# Patient Record
Sex: MALE | Race: OTHER | NOT HISPANIC OR LATINO | Employment: OTHER | URBAN - METROPOLITAN AREA
[De-identification: names, ages, dates, MRNs, and addresses within clinical notes are randomized per-mention and may not be internally consistent; named-entity substitution may affect disease eponyms.]

---

## 2022-05-23 ENCOUNTER — TELEPHONE (OUTPATIENT)
Dept: UROLOGY | Facility: CLINIC | Age: 82
End: 2022-05-23
Payer: MEDICARE

## 2022-05-24 DIAGNOSIS — R97.20 ELEVATED PSA: Primary | ICD-10-CM

## 2022-07-07 ENCOUNTER — OFFICE VISIT (OUTPATIENT)
Dept: UROLOGY | Facility: CLINIC | Age: 82
End: 2022-07-07
Payer: MEDICARE

## 2022-07-07 VITALS
BODY MASS INDEX: 20.72 KG/M2 | HEART RATE: 81 BPM | DIASTOLIC BLOOD PRESSURE: 75 MMHG | WEIGHT: 132 LBS | HEIGHT: 67 IN | SYSTOLIC BLOOD PRESSURE: 126 MMHG

## 2022-07-07 DIAGNOSIS — R97.20 ELEVATED PSA: ICD-10-CM

## 2022-07-07 PROCEDURE — 99204 OFFICE O/P NEW MOD 45 MIN: CPT | Mod: S$GLB,,, | Performed by: NURSE PRACTITIONER

## 2022-07-07 PROCEDURE — 99204 PR OFFICE/OUTPT VISIT, NEW, LEVL IV, 45-59 MIN: ICD-10-PCS | Mod: S$GLB,,, | Performed by: NURSE PRACTITIONER

## 2022-07-07 NOTE — PROGRESS NOTES
Subjective:       Patient ID: Mario Alvarado is a 81 y.o. male.    Chief Complaint: Elevated PSA      HPI: 81-year-old male new to the service for evaluation elevated PSA.  States he has not had a blood drawn or a SUKHDEV in years.  His primary care brought him in for labs resulting with a PSA of 27.5 completed May 19, 2022. Patient has no known family history of prostate cancer, but states he has 2 older brothers that are both wearing padded undergarment secondary to incontinence.  Patient denies any voiding difficulties.  No dysuria, frequency, urgency, incontinence or gross hematuria.  He states he masturbates all multiple times daily to orgasm.  He is unsure if this may be the reason for the PSA elevation.  No other urologic concerns.       Past Medical History:   Past Medical History:   Diagnosis Date    Cardiac anomaly     Elevated PSA        Past Surgical Historical:   Past Surgical History:   Procedure Laterality Date    pace maker          Medications:      Past Social History:   Social History     Socioeconomic History    Marital status:    Tobacco Use    Smoking status: Never Smoker    Smokeless tobacco: Never Used   Substance and Sexual Activity    Alcohol use: Never    Drug use: Never       Allergies:   Review of patient's allergies indicates:   Allergen Reactions    Penicillins         Family History: History reviewed. No pertinent family history.     Review of Systems:  Review of Systems   Constitutional: Negative for activity change and appetite change.   HENT: Negative for congestion and dental problem.    Eyes: Negative for visual disturbance.   Respiratory: Negative for chest tightness and shortness of breath.    Cardiovascular: Negative for chest pain.   Gastrointestinal: Negative for abdominal distention and abdominal pain.   Genitourinary: Negative for decreased urine volume, difficulty urinating, dysuria, enuresis, flank pain, frequency, genital sores, hematuria, penile discharge,  penile pain, penile swelling, scrotal swelling, testicular pain and urgency.   Musculoskeletal: Negative for back pain and neck pain.   Skin: Negative for color change.   Neurological: Negative for dizziness.   Hematological: Negative for adenopathy.   Psychiatric/Behavioral: Negative for agitation, behavioral problems and confusion.       Physical Exam:  Physical Exam  Constitutional:       Appearance: He is well-developed.   HENT:      Head: Normocephalic.   Eyes:      General: No scleral icterus.  Pulmonary:      Effort: Pulmonary effort is normal.      Breath sounds: Normal breath sounds.   Abdominal:      General: There is no distension.      Palpations: Abdomen is soft.      Tenderness: There is no abdominal tenderness.      Hernia: No hernia is present. There is no hernia in the right inguinal area or left inguinal area.   Genitourinary:     Penis: Normal.       Testes: Normal. Cremasteric reflex is present.      Prostate: Enlarged (Symmetrical smooth benign-feeling without nodule induration or tenderness).   Musculoskeletal:      Cervical back: Normal range of motion.   Skin:     General: Skin is warm and dry.   Neurological:      Mental Status: He is alert and oriented to person, place, and time.         Assessment/Plan:   Elevated PSA--27.5.  Repeat PSA today.  Schedule prostate ultrasound biopsy .  UA negative  Problem List Items Addressed This Visit    None     Visit Diagnoses     Elevated PSA

## 2022-07-08 LAB — PSA, DIAGNOSTIC: 36.2 NG/ML (ref 0–4)

## 2022-07-11 RX ORDER — BICALUTAMIDE 50 MG/1
50 TABLET, FILM COATED ORAL DAILY
Qty: 30 TABLET | Refills: 3 | Status: SHIPPED | OUTPATIENT
Start: 2022-07-11 | End: 2022-08-10

## 2022-07-12 ENCOUNTER — TELEPHONE (OUTPATIENT)
Dept: UROLOGY | Facility: CLINIC | Age: 82
End: 2022-07-12
Payer: MEDICARE

## 2022-07-12 NOTE — TELEPHONE ENCOUNTER
Patient notified of results and discussed medication sent out to pharmacy and scheduled patient for Lupron Shot 28 day per orders on Thursday Patient stated that he is working Wednesday. Patient verbalized understanding. [facility] LPN    ----- Message from Lionel Allan NP sent at 7/11/2022  7:52 PM CDT -----  Please notify patient of abnormal test results.  Notify PSA continues to rise now 36.2 from a previous of greater than 27 by his PCP.  I suspect prostate cancer as the root cause to proven otherwise by biopsy as previously ordered.  In the meantime will start him on bicalutamide 50 mg daily Rx sent to pharmacy of record.  Get patient into office stat for 28 daily Lupron injection

## 2022-07-12 NOTE — TELEPHONE ENCOUNTER
Left message regarding lab results.  LPN      ----- Message from Lionel Allan NP sent at 7/11/2022  7:52 PM CDT -----  Please notify patient of abnormal test results.  Notify PSA continues to rise now 36.2 from a previous of greater than 27 by his PCP.  I suspect prostate cancer as the root cause to proven otherwise by biopsy as previously ordered.  In the meantime will start him on bicalutamide 50 mg daily Rx sent to pharmacy of record.  Get patient into office stat for 28 daily Lupron injection

## 2022-07-14 ENCOUNTER — CLINICAL SUPPORT (OUTPATIENT)
Dept: UROLOGY | Facility: CLINIC | Age: 82
End: 2022-07-14
Payer: MEDICARE

## 2022-07-14 VITALS — BODY MASS INDEX: 20.72 KG/M2 | WEIGHT: 132 LBS | HEIGHT: 67 IN

## 2022-07-14 DIAGNOSIS — Z08 ENCOUNTER FOR FOLLOW-UP SURVEILLANCE OF PROSTATE CANCER: ICD-10-CM

## 2022-07-14 DIAGNOSIS — R97.20 ELEVATED PSA: Primary | ICD-10-CM

## 2022-07-14 DIAGNOSIS — Z85.46 ENCOUNTER FOR FOLLOW-UP SURVEILLANCE OF PROSTATE CANCER: ICD-10-CM

## 2022-07-14 PROCEDURE — 96402 CHEMO HORMON ANTINEOPL SQ/IM: CPT | Mod: S$GLB,,, | Performed by: NURSE PRACTITIONER

## 2022-07-14 PROCEDURE — 96402 PR CHEMOTHER HORMON ANTINEOPL SUB-Q/IM: ICD-10-PCS | Mod: S$GLB,,, | Performed by: NURSE PRACTITIONER

## 2022-07-14 RX ORDER — CIPROFLOXACIN 500 MG/1
500 TABLET ORAL 2 TIMES DAILY
Qty: 8 TABLET | Refills: 0 | Status: SHIPPED | OUTPATIENT
Start: 2022-07-14 | End: 2022-07-18

## 2022-07-14 RX ORDER — DIAZEPAM 10 MG/1
10 TABLET ORAL ONCE
Qty: 1 TABLET | Refills: 0 | Status: SHIPPED | OUTPATIENT
Start: 2022-07-14 | End: 2022-07-14

## 2022-07-14 NOTE — PROGRESS NOTES
Received verbal order for patient to give 28 day Lupron due to Elevated PSA, Lupron 7.5mg given to right upper hip and tolerated procedure well. Discussed side effects.Patient advised to sit in lobby for 15 minutes to monitor for reactions. Patient sat in lobby for education on biopsy. [facility] LPN

## 2022-07-14 NOTE — PROGRESS NOTES
Pre-op instructions and surgery consent TRUS BX reviewed with pt. Pt verbalized understanding. Surgery consent signed by pt.

## 2022-07-21 ENCOUNTER — TELEPHONE (OUTPATIENT)
Dept: UROLOGY | Facility: CLINIC | Age: 82
End: 2022-07-21
Payer: MEDICARE

## 2022-07-21 NOTE — TELEPHONE ENCOUNTER
Contacted pt advised that the only rx he has to dc is Blood thinners and Aspirin. Pt verbalized understanding. BJP    ----- Message from Julianne Kline sent at 7/21/2022  3:12 PM CDT -----  Pt is requesting a call back in regards to a question that he has about if he can take a certain medication before procedure. Pt can be reached at [phone] ([email or web address])

## 2022-07-25 ENCOUNTER — TELEPHONE (OUTPATIENT)
Dept: UROLOGY | Facility: CLINIC | Age: 82
End: 2022-07-25
Payer: MEDICARE

## 2022-07-25 NOTE — PATIENT INSTRUCTIONS
Patient Education       Prostate Biopsy Discharge Instructions   About this topic   The prostate is a part of your body that helps make semen. The prostate is located at the base of the penis and in front of the rectum.  Prostate biopsy is done:  To help your doctor know if the lump or tumor in your prostate is cancer or not.  If your blood test, called PSA or prostate specific antigen, is high. High PSA in the blood means disease in the prostate.  During a prostate biopsy, the doctor uses a needle to collect pieces of tissue from the prostate. The doctor sends the tissue to the lab. The lab then checks the tissue for infection or cancer.     What care is needed at home?   Ask your doctor what you need to do when you go home. Make sure you ask questions if you do not understand what the doctor says. This way you will know what you need to do.  Rest after the procedure to prevent bleeding from the biopsy site. Avoid activities like heavy lifting and hard exercise.  You may see some blood in your urine or stools for the next few days. You may also have blood in your semen for a few weeks.  Drink up to 8 glasses of water a day to help flush out blood.  Use an ice pack to help with the pain and to help stop the bleeding for the first 2 days after the procedure. Place an ice pack or a bag of frozen peas wrapped in a towel over the painful part. Never put ice right on the skin. Do not leave the ice on more than 10 to 15 minutes at a time. Use ice each hour as needed.  Keep your rectal opening and penis clean to prevent infection.  Keep your wound dry for the next 24 hours. Ask your doctor about when you may take a bath or shower.  Change the dressing if it gets soaked.  What follow-up care is needed?   Your doctor may ask you to make visits to the office to check on your progress. Be sure to keep your visits.  It may take up to 2 weeks for your doctor to get the results. You may be asked to return to the doctor's office  for the result of the biopsy in 2 to 3 weeks. The results will help your doctor understand what kind of problem you have with your prostate. Together you can make a plan for more care.  Your doctor will talk with you if any other treatment is needed.  What drugs may be needed?   The doctor may order drugs to:  Help with pain  Prevent infection  Will physical activity be limited?   Avoid doing activities that may put pressure on your rectal area for the next 7 days. You may be more comfortable if you do not ride a bike, horse, or motorcycle.  Limit your sexual activity for a few days after the procedure. Ask your doctor when you can have sex.  Do not strain when going to the bathroom. Don't hold your urine. Holding back from urinating can irritate your bladder and lead to a urinary tract infection.  Avoid constipation by eating foods high in fiber and staying hydrated. Straining to pass stool can worsen your symptoms as you heal.  What problems could happen?   Infection at the biopsy site  Infection elsewhere in your body  Bleeding from your rectum, or blood in your urine or semen  Tumor spread  Very bad pain  Bladder or rectum perforation  Urinary tract infection  Trouble passing urine  Erectile dysfunction  Reduced sexual activity  When do I need to call the doctor?   Signs of infection like fever of 100.4°F (38°C) or higher, chills, burning or pain when you pass urine.  No urine or more problems passing urine  Dizziness, confusion, or weakness  Yellowish, greenish, or bloody discharge from the penis  Lots of rectal bleeding or large amounts of blood in the urine  Pain does not go away even after taking your drugs  Teach Back: Helping You Understand   The Teach Back Method helps you understand the information we are giving you. After you talk with the staff, tell them in your own words what you learned. This helps to make sure the staff has described each thing clearly. It also helps to explain things that may have  been confusing. Before going home, make sure you can do these:  I can tell you about my procedure.  I can tell you what may help ease my pain.  I can tell you what I will do if I have a fever, chills, problems passing urine, or drainage from my penis.    NO LIFTING GREATER THAN 10 LBS  NO SEXUAL AXTIVITY FOR 7 DAYS  NO STRENUOUS ACTIVITY FOR 7 DAYS    Where can I learn more?   American Cancer Society  https://www.cancer.org/cancer/prostate-cancer/ryvyvssao-lcdijzwhz-vlztrli/how-diagnosed.html   [employer] [place]  https://www.radiologyinfo.org/en/info.cfm?pg=prostate-biopsy   Last Reviewed Date   2021-05-18  Consumer Information Use and Disclaimer   This information is not specific medical advice and does not replace information you receive from your health care provider. This is only a brief summary of general information. It does NOT include all information about conditions, illnesses, injuries, tests, procedures, treatments, therapies, discharge instructions or life-style choices that may apply to you. You must talk with your health care provider for complete information about your health and treatment options. This information should not be used to decide whether or not to accept your health care providers advice, instructions or recommendations. Only your health care provider has the knowledge and training to provide advice that is right for you.  Copyright   Copyright © 2021 [employer] and its affiliates and/or licensors. All rights reserved.

## 2022-07-26 ENCOUNTER — PROCEDURE VISIT (OUTPATIENT)
Dept: UROLOGY | Facility: CLINIC | Age: 82
End: 2022-07-26
Payer: MEDICARE

## 2022-07-26 VITALS
BODY MASS INDEX: 20.86 KG/M2 | HEART RATE: 60 BPM | WEIGHT: 133.19 LBS | DIASTOLIC BLOOD PRESSURE: 59 MMHG | RESPIRATION RATE: 16 BRPM | OXYGEN SATURATION: 96 % | SYSTOLIC BLOOD PRESSURE: 109 MMHG

## 2022-07-26 DIAGNOSIS — R97.20 ELEVATED PSA: Primary | ICD-10-CM

## 2022-07-26 PROCEDURE — 55700 TRANSRECTAL ULTRASOUND W/ BIOPSY: ICD-10-PCS | Mod: S$GLB,,, | Performed by: UROLOGY

## 2022-07-26 PROCEDURE — 76872 TRANSRECTAL ULTRASOUND W/ BIOPSY: ICD-10-PCS | Mod: S$GLB,,, | Performed by: UROLOGY

## 2022-07-26 PROCEDURE — 55700 TRANSRECTAL ULTRASOUND W/ BIOPSY: CPT | Mod: S$GLB,,, | Performed by: UROLOGY

## 2022-07-26 PROCEDURE — 76872 US TRANSRECTAL: CPT | Mod: S$GLB,,, | Performed by: UROLOGY

## 2022-07-26 NOTE — PROCEDURES
"Transrectal Ultrasound w/ Biopsy    Date/Time: 7/26/2022 11:30 AM  Performed by: Khurram Cannon MD  Authorized by: Lionel Allan NP     Consent Done?:  Yes (Written)  Time out: Immediately prior to procedure a "time out" was called to verify the correct patient, procedure, equipment, support staff and site/side marked as required.    Indications: Elevated PSA    Position:  Left lateral  Anesthesia:  Pudendal nerve block  Patient sedated: No    Prostate Size:  26g  Total Biopsies:  12    Patient tolerance:  Patient tolerated the procedure well with no immediate complications     Patient was brought to the procedure room placed on the table in left lateral decubitus position lidocaine jelly was instilled into the rectum the ultrasound probe was advanced to level of prostate and a total of 10 cc of lidocaine was injected into the bilateral indigo prostatic fossa.  A standard 12 core prostate biopsy was obtained patient tolerated the procedure well there were no complications      "

## 2022-08-02 ENCOUNTER — OFFICE VISIT (OUTPATIENT)
Dept: UROLOGY | Facility: CLINIC | Age: 82
End: 2022-08-02
Payer: MEDICARE

## 2022-08-02 VITALS
SYSTOLIC BLOOD PRESSURE: 149 MMHG | DIASTOLIC BLOOD PRESSURE: 69 MMHG | HEIGHT: 67 IN | HEART RATE: 72 BPM | BODY MASS INDEX: 20.88 KG/M2 | WEIGHT: 133 LBS

## 2022-08-02 DIAGNOSIS — R97.20 ELEVATED PSA: Primary | ICD-10-CM

## 2022-08-02 DIAGNOSIS — C61 PROSTATE CANCER: ICD-10-CM

## 2022-08-02 PROCEDURE — 99214 PR OFFICE/OUTPT VISIT, EST, LEVL IV, 30-39 MIN: ICD-10-PCS | Mod: S$GLB,,, | Performed by: UROLOGY

## 2022-08-02 PROCEDURE — 99214 OFFICE O/P EST MOD 30 MIN: CPT | Mod: S$GLB,,, | Performed by: UROLOGY

## 2022-08-02 NOTE — PROGRESS NOTES
Subjective:       Patient ID: Mario Alvarado is a 81 y.o. male.    Chief Complaint: Elevated PSA      HPI:  81-year-old male with a PSA around 36 he recently underwent a biopsy which revealed only Ruby 6 in 2 of 12 cores certainly less prostate cancer than would be expected based off the patient's PSA.  He was started on case a dex he is here in follow-up    Past Medical History:   Past Medical History:   Diagnosis Date    Cardiac anomaly     Elevated PSA        Past Surgical Historical:   Past Surgical History:   Procedure Laterality Date    pace maker          Medications:   Medication List with Changes/Refills   Current Medications    BICALUTAMIDE (CASODEX) 50 MG TAB    Take 1 tablet (50 mg total) by mouth once daily.        Past Social History:   Social History     Socioeconomic History    Marital status:    Tobacco Use    Smoking status: Never Smoker    Smokeless tobacco: Never Used   Substance and Sexual Activity    Alcohol use: Never    Drug use: Never       Allergies:   Review of patient's allergies indicates:   Allergen Reactions    Penicillins         Family History: History reviewed. No pertinent family history.     Review of Systems:  Review of Systems   Constitutional: Negative for activity change and appetite change.   HENT: Negative for congestion and dental problem.    Eyes: Negative for visual disturbance.   Respiratory: Negative for chest tightness and shortness of breath.    Cardiovascular: Negative for chest pain.   Gastrointestinal: Negative for abdominal distention and abdominal pain.   Genitourinary: Negative for decreased urine volume, difficulty urinating, dysuria, enuresis, flank pain, frequency, genital sores, hematuria, penile discharge, penile pain, penile swelling, scrotal swelling, testicular pain and urgency.   Musculoskeletal: Negative for back pain and neck pain.   Skin: Negative for color change.   Neurological: Negative for dizziness.   Hematological: Negative  for adenopathy.   Psychiatric/Behavioral: Negative for agitation, behavioral problems and confusion.       Physical Exam:  Physical Exam  Constitutional:       General: He is not in acute distress.     Appearance: He is well-developed.   HENT:      Head: Normocephalic and atraumatic.      Nose: Nose normal.   Eyes:      General: No scleral icterus.     Conjunctiva/sclera: Conjunctivae normal.      Pupils: Pupils are equal, round, and reactive to light.   Neck:      Thyroid: No thyromegaly.      Trachea: No tracheal deviation.   Cardiovascular:      Rate and Rhythm: Normal rate and regular rhythm.      Heart sounds: Normal heart sounds.   Pulmonary:      Effort: Pulmonary effort is normal. No respiratory distress.      Breath sounds: Normal breath sounds. No wheezing or rales.   Abdominal:      General: Bowel sounds are normal. There is no distension.      Palpations: Abdomen is soft.      Tenderness: There is no abdominal tenderness. There is no guarding or rebound.   Genitourinary:     Penis: Normal. No tenderness.       Prostate: Normal.   Musculoskeletal:         General: No deformity. Normal range of motion.      Cervical back: Neck supple.   Lymphadenopathy:      Cervical: No cervical adenopathy.   Skin:     General: Skin is warm and dry.      Findings: No erythema or rash.   Neurological:      Mental Status: He is alert and oriented to person, place, and time.      Cranial Nerves: No cranial nerve deficit.   Psychiatric:         Behavior: Behavior normal.         Assessment/Plan:       Problem List Items Addressed This Visit    None     Visit Diagnoses     Elevated PSA    -  Primary    Relevant Orders    NM Bone Scan Whole Body    Prostate cancer                 I had a long discussion with the patient regarding treatment for prostate cancer and all of the available options.  We discussed external beam radiation and all of the risks and benefits, discussed brachytherapy as well as robotic assisted radical  prostatectomy.  We had a long discussion on surgery itself including the risks of worsening erectile dysfunction, urinary incontinence, injury to the bowel or anastomotic leak.  All questions were answered the patient reported having a clear understanding of the treatment of prostate cancer and the risk and benefits of each modality.  The appropriate amountof time was spent in face-to-face communication discussing the treatment for prostate cancer.  We had a long discussion about his PSA being quite higher than would be expected for his amount of prostate cancer found on biopsy I would like to do a bone scan.  The patient asked if masturbation could cause an elevated PSA told him he should not certainly not to that degree less there was trauma to the prostate.  The patient's bone scan is negative we will stop the bicalutamide and start patient on active surveillance

## 2022-08-18 ENCOUNTER — TELEPHONE (OUTPATIENT)
Dept: UROLOGY | Facility: CLINIC | Age: 82
End: 2022-08-18

## 2022-08-18 ENCOUNTER — OFFICE VISIT (OUTPATIENT)
Dept: UROLOGY | Facility: CLINIC | Age: 82
End: 2022-08-18
Payer: MEDICARE

## 2022-08-18 VITALS
DIASTOLIC BLOOD PRESSURE: 74 MMHG | HEART RATE: 78 BPM | HEIGHT: 67 IN | SYSTOLIC BLOOD PRESSURE: 139 MMHG | BODY MASS INDEX: 20.56 KG/M2 | WEIGHT: 131 LBS

## 2022-08-18 DIAGNOSIS — C61 PROSTATE CANCER: Primary | ICD-10-CM

## 2022-08-18 LAB
CREAT SERPL-MCNC: 1.01 MG/DL (ref 0.7–1.2)
GFR ESTIMATION: 71.08
PSA, DIAGNOSTIC: 0.62 NG/ML (ref 0–4)

## 2022-08-18 PROCEDURE — 99499 NO LOS: ICD-10-PCS | Mod: S$GLB,,, | Performed by: NURSE PRACTITIONER

## 2022-08-18 PROCEDURE — 99499 UNLISTED E&M SERVICE: CPT | Mod: S$GLB,,, | Performed by: NURSE PRACTITIONER

## 2022-08-18 PROCEDURE — 96402 CHEMO HORMON ANTINEOPL SQ/IM: CPT | Mod: S$GLB,,, | Performed by: NURSE PRACTITIONER

## 2022-08-18 PROCEDURE — 96402 PR CHEMOTHER HORMON ANTINEOPL SUB-Q/IM: ICD-10-PCS | Mod: S$GLB,,, | Performed by: NURSE PRACTITIONER

## 2022-08-18 NOTE — TELEPHONE ENCOUNTER
Notified pt of psa results and information given from provider to follow up with ct scan as followed. ED----- Message from Lionel Allan NP sent at 8/18/2022 12:15 PM CDT -----  Please notify patient of abnormal test results.  Notify patient PSA is trended down to 0.6 from a previous 36.2.  Continue current plan of care.  CT scan as ordered at time of visit today

## 2022-08-18 NOTE — PROGRESS NOTES
Subjective:       Patient ID: Mario Alvarado is a 81 y.o. male.    Chief Complaint: Elevated PSA      HPI: 81-year-old male known service history of elevated PSA of 36.2.  He underwent prostate ultrasound biopsy July 26, 2022 findings were Ruby 6 3+3 on the right mid and right base of the gland both group 1.  Right base was 4% in the right mid was 9%.  Patient was paced on bicalutamide 50 mg pending a bone scan results.  Bone scan was completed August 12, 2022.  Findings were focal areas of uptake in the cervical, thoracic and lumbar spine.  Radiologist was unable to determine if this was traumatic injury, degenerative change or actual lytic or blastic lesion.  He is requesting a CT scan for further evaluation.  Patient denies any new onset bone pain or unexplained weight loss no other urologic concerns.       Past Medical History:   Past Medical History:   Diagnosis Date    Cardiac anomaly     Elevated PSA        Past Surgical Historical:   Past Surgical History:   Procedure Laterality Date    pace maker          Medications:   Medication List with Changes/Refills   Current Medications    BICALUTAMIDE (CASODEX) 50 MG TAB    Take 1 tablet (50 mg total) by mouth once daily.        Past Social History:   Social History     Socioeconomic History    Marital status:    Tobacco Use    Smoking status: Never Smoker    Smokeless tobacco: Never Used   Substance and Sexual Activity    Alcohol use: Never    Drug use: Never       Allergies:   Review of patient's allergies indicates:   Allergen Reactions    Penicillins         Family History: History reviewed. No pertinent family history.     Review of Systems:  Review of Systems   Constitutional: Negative for activity change and appetite change.   HENT: Negative for congestion and dental problem.    Eyes: Negative for visual disturbance.   Respiratory: Negative for chest tightness and shortness of breath.    Cardiovascular: Negative for chest pain.    Gastrointestinal: Negative for abdominal distention and abdominal pain.   Genitourinary: Negative for decreased urine volume, difficulty urinating, dysuria, enuresis, flank pain, frequency, genital sores, hematuria, penile discharge, penile pain, penile swelling, scrotal swelling, testicular pain and urgency.   Musculoskeletal: Negative for back pain and neck pain.   Skin: Negative for color change.   Neurological: Negative for dizziness.   Hematological: Negative for adenopathy.   Psychiatric/Behavioral: Negative for agitation, behavioral problems and confusion.       Physical Exam:  Physical Exam  Constitutional:       Appearance: He is well-developed.   HENT:      Head: Normocephalic.   Eyes:      General: No scleral icterus.  Pulmonary:      Effort: Pulmonary effort is normal.      Breath sounds: Normal breath sounds.   Abdominal:      General: There is no distension.      Palpations: Abdomen is soft.      Tenderness: There is no abdominal tenderness.      Hernia: No hernia is present. There is no hernia in the right inguinal area or left inguinal area.   Genitourinary:     Penis: Normal.       Testes: Normal. Cremasteric reflex is present.   Musculoskeletal:      Cervical back: Normal range of motion.   Skin:     General: Skin is warm and dry.   Neurological:      Mental Status: He is alert and oriented to person, place, and time.         Assessment/Plan:   Prostate cancer--positive bone scan with uncertain etiology.  CT scan abdomen pelvis  with/ without contrast ordered after discussion with Dr. Cannon.  Continue Casodex 50 mg daily adequate on hand.  Lupron 6 month injection given today.  Serum creatinine ordered for contrast study.  Tentatively patient be scheduled back for 3 months subcu to change based off of CT findings.  Problem List Items Addressed This Visit    None     Visit Diagnoses     Prostate cancer    -  Primary    Relevant Orders    Prostate Specific Antigen, Diagnostic    CT Abdomen Pelvis   Without Contrast    CT Abdomen Pelvis With Contrast    Creatinine, serum    Creatinine, Serum

## 2022-08-18 NOTE — PROGRESS NOTES
Per order RC, using aseptic technique pt was given 45mg Lupron to left. Pt advised to sit in lobby 15-30 minutes to monitor for adverse reaction.     Next Lupron due on or after 2-.

## 2022-08-25 ENCOUNTER — TELEPHONE (OUTPATIENT)
Dept: UROLOGY | Facility: CLINIC | Age: 82
End: 2022-08-25
Payer: MEDICARE

## 2022-08-25 NOTE — TELEPHONE ENCOUNTER
----- Message from Lionel Allan NP sent at 8/25/2022 11:07 AM CDT -----  Please notify patient of abnormal test results. .  Diverticulosis.        2.  2 mm nonobstructing left renal stone.        3.  CAD. Pacemaker. Abdominal aortic ectasia up to 3.3 cm diameter.        4.  Prostate enlargement.      No sign of advanced prostate cancer on study

## 2022-11-15 ENCOUNTER — TELEPHONE (OUTPATIENT)
Dept: UROLOGY | Facility: CLINIC | Age: 82
End: 2022-11-15
Payer: MEDICARE

## 2022-12-15 ENCOUNTER — OFFICE VISIT (OUTPATIENT)
Dept: UROLOGY | Facility: CLINIC | Age: 82
End: 2022-12-15
Payer: MEDICARE

## 2022-12-15 DIAGNOSIS — C61 PROSTATE CANCER: Primary | ICD-10-CM

## 2022-12-15 LAB — PSA, DIAGNOSTIC: <0.014 NG/ML (ref 0–4)

## 2022-12-15 PROCEDURE — 99214 OFFICE O/P EST MOD 30 MIN: CPT | Mod: S$GLB,,, | Performed by: NURSE PRACTITIONER

## 2022-12-15 PROCEDURE — 99214 PR OFFICE/OUTPT VISIT, EST, LEVL IV, 30-39 MIN: ICD-10-PCS | Mod: S$GLB,,, | Performed by: NURSE PRACTITIONER

## 2022-12-15 NOTE — PROGRESS NOTES
Subjective:       Patient ID: Mario Alvarado is a 82 y.o. male.    Chief Complaint: Prostate Cancer (3mth fu rs from nov 22)      HPI: 82-year-old male known service history of elevated PSA of 36.2.  He underwent prostate ultrasound biopsy July 26, 2022 findings were [place] 6 3+3 on the right mid and right base of the gland both group 1.  Right base was 4% in the right mid was 9%.  Patient was paced on bicalutamide 50 mg pending a bone scan results.  Bone scan was completed August 12, 2022.  Findings were focal areas of uptake in the cervical, thoracic and lumbar spine.  Radiologist was unable to determine if this was traumatic injury, degenerative change or actual lytic or blastic lesion.  He is requesting a CT scan for further evaluation.  CT scan completed showed scoliosis as well as degenerative changes.  Patient denies any new onset bone pain or unexplained weight loss.  Most recent PSA 3 months ago was 0.6.  Patient is on Lupron injections his next injection is due February of 2023.  He was on short course of bicalutamide prevent PSA flare.  He is no longer on this.  no other urologic concerns.       Past Medical History:   Past Medical History:   Diagnosis Date    Cardiac anomaly     Elevated PSA     Prostate cancer        Past Surgical Historical:   Past Surgical History:   Procedure Laterality Date    INSERTION OF PACEMAKER      PROSTATE SURGERY  2022    bx +        Medications:   Medication List with Changes/Refills   Current Medications    BICALUTAMIDE (CASODEX) 50 MG TAB    Take 1 tablet (50 mg total) by mouth once daily.    LEUPROLIDE ACETATE, 6 MONTH, (LUPRON) 45 MG SYKT INJECTION    Inject 45 mg into the muscle every 6 (six) months.        Past Social History:   Social History     Socioeconomic History    Marital status:    Tobacco Use    Smoking status: Never    Smokeless tobacco: Never   Substance and Sexual Activity    Alcohol use: Never    Drug use: Never       Allergies:   Review of  patient's allergies indicates:   Allergen Reactions    Penicillins         Family History:   Family History   Problem Relation Age of Onset    No Known Problems Father     No Known Problems Mother         Review of Systems:  Review of Systems   Constitutional:  Negative for activity change and appetite change.   HENT:  Negative for congestion and dental problem.    Eyes:  Negative for visual disturbance.   Respiratory:  Negative for chest tightness and shortness of breath.    Cardiovascular:  Negative for chest pain.   Gastrointestinal:  Negative for abdominal distention and abdominal pain.   Genitourinary:  Negative for decreased urine volume, difficulty urinating, dysuria, enuresis, flank pain, frequency, genital sores, hematuria, penile discharge, penile pain, penile swelling, scrotal swelling, testicular pain and urgency.   Musculoskeletal:  Negative for back pain and neck pain.   Skin:  Negative for color change.   Neurological:  Negative for dizziness.   Hematological:  Negative for adenopathy.   Psychiatric/Behavioral:  Negative for agitation, behavioral problems and confusion.      Physical Exam:  Physical Exam  Constitutional:       Appearance: He is well-developed.   HENT:      Head: Normocephalic.   Eyes:      General: No scleral icterus.  Pulmonary:      Effort: Pulmonary effort is normal.      Breath sounds: Normal breath sounds.   Abdominal:      General: There is no distension.      Palpations: Abdomen is soft.      Tenderness: There is no abdominal tenderness.      Hernia: No hernia is present. There is no hernia in the right inguinal area or left inguinal area.   Genitourinary:     Penis: Normal.       Testes: Normal. Cremasteric reflex is present.   Musculoskeletal:      Cervical back: Normal range of motion.   Skin:     General: Skin is warm and dry.   Neurological:      Mental Status: He is alert and oriented to person, place, and time.       Assessment/Plan:   Prostate cancer--patient on  androgen deprivation therapy with a positive PSA response as drawn 3 months ago.  Next PSA due today this is drawn.  Continue Lupron next injection due February 2023.  Plan of care will be the same unless indicated by today's lab draw  Problem List Items Addressed This Visit    None  Visit Diagnoses       Prostate cancer    -  Primary    Relevant Orders    Prostate Specific Antigen, Diagnostic

## 2023-02-16 ENCOUNTER — OFFICE VISIT (OUTPATIENT)
Dept: UROLOGY | Facility: CLINIC | Age: 83
End: 2023-02-16
Payer: MEDICARE

## 2023-02-16 VITALS — SYSTOLIC BLOOD PRESSURE: 118 MMHG | DIASTOLIC BLOOD PRESSURE: 74 MMHG | HEART RATE: 75 BPM

## 2023-02-16 DIAGNOSIS — C61 PROSTATE CANCER: Primary | ICD-10-CM

## 2023-02-16 PROCEDURE — 99499 UNLISTED E&M SERVICE: CPT | Mod: S$GLB,,, | Performed by: NURSE PRACTITIONER

## 2023-02-16 PROCEDURE — 96402 PR CHEMOTHER HORMON ANTINEOPL SUB-Q/IM: ICD-10-PCS | Mod: S$GLB,,, | Performed by: NURSE PRACTITIONER

## 2023-02-16 PROCEDURE — 99499 NO LOS: ICD-10-PCS | Mod: S$GLB,,, | Performed by: NURSE PRACTITIONER

## 2023-02-16 PROCEDURE — 96402 CHEMO HORMON ANTINEOPL SQ/IM: CPT | Mod: S$GLB,,, | Performed by: NURSE PRACTITIONER

## 2023-02-16 NOTE — PROGRESS NOTES
Subjective:       Patient ID: Mario Alvarado is a 82 y.o. male.    Chief Complaint: No chief complaint on file.      HPI: 82-year-old male, established patient, last seen December 2022.    Patient has history of prostate cancer.    Patient had a biopsy in July 2022 with 2 samples being positive.  Patient underwent bone scan and CT imaging.  He was initially put on Casodex.  He is now on Lupron injections.  Last PSA was undetectable.      Patient states he is doing well.  Denies any adverse effects to the Lupron.  Denies any breast swelling.  Denies any hot flashes.  Denies any significant weight loss.  Denies any new onset bone pain.  Denies any blood in urine.  Denies any difficulty voiding.  Denies any pain or burning urination.    No other urinary complaints at this time.       Past Medical History:   Past Medical History:   Diagnosis Date    Cardiac anomaly     Elevated PSA     Prostate cancer        Past Surgical Historical:   Past Surgical History:   Procedure Laterality Date    INSERTION OF PACEMAKER      PROSTATE SURGERY  2022    bx +        Medications:   Medication List with Changes/Refills   Current Medications    BICALUTAMIDE (CASODEX) 50 MG TAB    Take 1 tablet (50 mg total) by mouth once daily.   Discontinued Medications    LEUPROLIDE ACETATE, 6 MONTH, (LUPRON) 45 MG SYKT INJECTION    Inject 45 mg into the muscle every 6 (six) months.        Past Social History:   Social History     Socioeconomic History    Marital status:    Tobacco Use    Smoking status: Never    Smokeless tobacco: Never   Substance and Sexual Activity    Alcohol use: Never    Drug use: Never       Allergies:   Review of patient's allergies indicates:   Allergen Reactions    Penicillins         Family History:   Family History   Problem Relation Age of Onset    No Known Problems Father     No Known Problems Mother         Review of Systems:  Review of Systems   Constitutional:  Negative for activity change and appetite  change.   HENT:  Negative for congestion and dental problem.    Respiratory:  Negative for chest tightness and shortness of breath.    Cardiovascular:  Negative for chest pain.   Gastrointestinal:  Negative for abdominal distention and abdominal pain.   Genitourinary:  Negative for decreased urine volume, difficulty urinating, dysuria, enuresis, flank pain, frequency, genital sores, hematuria, penile discharge, penile pain, penile swelling, scrotal swelling, testicular pain and urgency.   Musculoskeletal:  Negative for back pain and neck pain.   Neurological:  Negative for dizziness.   Hematological:  Negative for adenopathy.   Psychiatric/Behavioral:  Negative for agitation, behavioral problems and confusion.      Physical Exam:  Physical Exam  Vitals and nursing note reviewed.   Constitutional:       Appearance: He is well-developed.   HENT:      Head: Normocephalic.   Cardiovascular:      Rate and Rhythm: Normal rate and regular rhythm.      Heart sounds: Normal heart sounds.   Pulmonary:      Effort: Pulmonary effort is normal.      Breath sounds: Normal breath sounds.   Abdominal:      General: Bowel sounds are normal.      Palpations: Abdomen is soft.   Skin:     General: Skin is warm and dry.   Neurological:      Mental Status: He is alert and oriented to person, place, and time.       Assessment/Plan:   Prostate cancer:  Will check the patient's PSA.  We will notify him of the results.    Patient is due for Lupron injection today.  Will get a 6 month Lupron.      Will plan follow-up in 3 months to recheck PSA.    Will be due for Lupron injection in 6 months.  Problem List Items Addressed This Visit          Oncology    Prostate cancer - Primary    Overview     PSA of 36.2.  He underwent prostate ultrasound biopsy July 26, 2022 findings were [place] 6 3+3 on the right mid and right base of the gland both group 1.  Right base was 4% in the right mid was 9%.  Patient was paced on bicalutamide 50 mg pending a  bone scan results.  Bone scan was completed August 12, 2022.  Findings were focal areas of uptake in the cervical, thoracic and lumbar spine.  Radiologist was unable to determine if this was traumatic injury, degenerative change or actual lytic or blastic lesion.  He is requesting a CT scan for further evaluation.  CT scan completed showed scoliosis as well as degenerative changes.  Patient denies any new onset bone pain or unexplained weight loss.  Most recent PSA 3 months ago was 0.6.  Patient is on Lupron injections his next injection is due February of 2023.  He was on short course of bicalutamide prevent PSA flare.  He is no longer on this.  no other urologic concerns.              Relevant Medications    leuprolide acetate (6 month) injection 45 mg (Start on 2/16/2023  4:30 PM)    Other Relevant Orders    Prostate Specific Antigen, Diagnostic

## 2023-02-17 LAB — PSA, DIAGNOSTIC: <0.014 NG/ML (ref 0–4)

## 2023-02-20 ENCOUNTER — TELEPHONE (OUTPATIENT)
Dept: UROLOGY | Facility: CLINIC | Age: 83
End: 2023-02-20
Payer: MEDICARE

## 2023-02-20 NOTE — TELEPHONE ENCOUNTER
----- Message from Juan Manuel Dunaway NP sent at 2/20/2023  8:24 AM CST -----  PSA is undetectable.

## 2023-05-18 ENCOUNTER — OFFICE VISIT (OUTPATIENT)
Dept: UROLOGY | Facility: CLINIC | Age: 83
End: 2023-05-18
Payer: MEDICARE

## 2023-05-18 VITALS — BODY MASS INDEX: 20.56 KG/M2 | HEIGHT: 67 IN | WEIGHT: 131 LBS | RESPIRATION RATE: 20 BRPM

## 2023-05-18 DIAGNOSIS — C61 PROSTATE CANCER: Primary | ICD-10-CM

## 2023-05-18 LAB — PSA, DIAGNOSTIC: < 0.01 NG/ML (ref 0.1–4)

## 2023-05-18 PROCEDURE — 99213 PR OFFICE/OUTPT VISIT, EST, LEVL III, 20-29 MIN: ICD-10-PCS | Mod: S$GLB,,, | Performed by: NURSE PRACTITIONER

## 2023-05-18 PROCEDURE — 99213 OFFICE O/P EST LOW 20 MIN: CPT | Mod: S$GLB,,, | Performed by: NURSE PRACTITIONER

## 2023-05-18 NOTE — PROGRESS NOTES
Subjective:       Patient ID: Mario Alvarado is a 82 y.o. male.    Chief Complaint: 3 mth f/u and Prostate Cancer      HPI: 82-year-old male, established patient, presents for 3 month visit.    Patient has history of prostate cancer.  He is on Lupron injections.    Patient's last Lupron was in February.  He will not be due for his next Lupron until August.    Patient denies any hot flashes.  Denies any breast tenderness.      Patient is currently in a long term care facility getting some rehabilitation.  He is at the [facility].  The patient had a fall since last visit.  He was in the hospital for a week.  He developed bedsores on his heels.    Have some leakage issues since his fall.    Denies any pain or burning urination.  Denies any odor urine.  Denies any fever body aches.  Denies any unexplained weight loss.    No other urinary complaints.       Past Medical History:   Past Medical History:   Diagnosis Date    Cardiac anomaly     Elevated PSA     Prostate cancer        Past Surgical Historical:   Past Surgical History:   Procedure Laterality Date    INSERTION OF PACEMAKER      PROSTATE SURGERY  2022    bx +        Medications:   Medication List with Changes/Refills   Current Medications    BICALUTAMIDE (CASODEX) 50 MG TAB    Take 1 tablet (50 mg total) by mouth once daily.        Past Social History:   Social History     Socioeconomic History    Marital status:    Tobacco Use    Smoking status: Never    Smokeless tobacco: Never   Substance and Sexual Activity    Alcohol use: Never    Drug use: Never       Allergies:   Review of patient's allergies indicates:   Allergen Reactions    Penicillins         Family History:   Family History   Problem Relation Age of Onset    No Known Problems Father     No Known Problems Mother         Review of Systems:  Review of Systems   Constitutional:  Negative for activity change and appetite change.   HENT:  Negative for congestion and dental problem.    Respiratory:   Negative for chest tightness and shortness of breath.    Cardiovascular:  Negative for chest pain.   Gastrointestinal:  Negative for abdominal distention and abdominal pain.   Genitourinary:  Negative for decreased urine volume, difficulty urinating, dysuria, enuresis, flank pain, frequency, genital sores, hematuria, penile discharge, penile pain, penile swelling, scrotal swelling, testicular pain and urgency.   Musculoskeletal:  Negative for back pain and neck pain.   Neurological:  Negative for dizziness.   Hematological:  Negative for adenopathy.   Psychiatric/Behavioral:  Negative for agitation, behavioral problems and confusion.      Physical Exam:  Physical Exam  Vitals and nursing note reviewed.   Constitutional:       Appearance: He is well-developed.   HENT:      Head: Normocephalic.   Cardiovascular:      Rate and Rhythm: Normal rate and regular rhythm.      Heart sounds: Normal heart sounds.   Pulmonary:      Effort: Pulmonary effort is normal.      Breath sounds: Normal breath sounds.   Abdominal:      General: Bowel sounds are normal.      Palpations: Abdomen is soft.   Skin:     General: Skin is warm and dry.   Neurological:      Mental Status: He is alert and oriented to person, place, and time.       Assessment/Plan:   Prostate cancer:  Check the patient's PSA.  We will notify his family of the results.    Patient will be due for Lupron in August.    Patient will follow-up in August as scheduled.    Problem List Items Addressed This Visit          Oncology    Prostate cancer - Primary    Overview     PSA of 36.2.  He underwent prostate ultrasound biopsy July 26, 2022 findings were [place] 6 3+3 on the right mid and right base of the gland both group 1.  Right base was 4% in the right mid was 9%.  Patient was paced on bicalutamide 50 mg pending a bone scan results.  Bone scan was completed August 12, 2022.  Findings were focal areas of uptake in the cervical, thoracic and lumbar spine.  Radiologist  was unable to determine if this was traumatic injury, degenerative change or actual lytic or blastic lesion.  He is requesting a CT scan for further evaluation.  CT scan completed showed scoliosis as well as degenerative changes.  Patient denies any new onset bone pain or unexplained weight loss.  Most recent PSA 3 months ago was 0.6.  Patient is on Lupron injections his next injection is due February of 2023.  He was on short course of bicalutamide prevent PSA flare.  He is no longer on this.  no other urologic concerns.              Relevant Orders    Prostate Specific Antigen, Diagnostic

## 2023-08-17 ENCOUNTER — OFFICE VISIT (OUTPATIENT)
Dept: UROLOGY | Facility: CLINIC | Age: 83
End: 2023-08-17
Payer: MEDICARE

## 2023-08-17 VITALS — SYSTOLIC BLOOD PRESSURE: 102 MMHG | DIASTOLIC BLOOD PRESSURE: 68 MMHG

## 2023-08-17 DIAGNOSIS — C61 PROSTATE CANCER: Primary | ICD-10-CM

## 2023-08-17 PROCEDURE — 96402 PR CHEMOTHER HORMON ANTINEOPL SUB-Q/IM: ICD-10-PCS | Mod: S$GLB,,, | Performed by: NURSE PRACTITIONER

## 2023-08-17 PROCEDURE — 36415 PR COLLECTION VENOUS BLOOD,VENIPUNCTURE: ICD-10-PCS | Mod: S$GLB,,, | Performed by: NURSE PRACTITIONER

## 2023-08-17 PROCEDURE — 99499 UNLISTED E&M SERVICE: CPT | Mod: S$GLB,,, | Performed by: NURSE PRACTITIONER

## 2023-08-17 PROCEDURE — 36415 COLL VENOUS BLD VENIPUNCTURE: CPT | Mod: S$GLB,,, | Performed by: NURSE PRACTITIONER

## 2023-08-17 PROCEDURE — 96402 CHEMO HORMON ANTINEOPL SQ/IM: CPT | Mod: S$GLB,,, | Performed by: NURSE PRACTITIONER

## 2023-08-17 PROCEDURE — 99499 NO LOS: ICD-10-PCS | Mod: S$GLB,,, | Performed by: NURSE PRACTITIONER

## 2023-08-17 RX ORDER — ASCORBIC ACID 500 MG
500 TABLET ORAL DAILY
COMMUNITY

## 2023-08-17 RX ORDER — MULTIVITAMIN
1 TABLET ORAL DAILY
COMMUNITY

## 2023-08-17 RX ORDER — DOCUSATE SODIUM 50 MG/5ML
50 LIQUID ORAL DAILY
COMMUNITY

## 2023-08-17 RX ORDER — ESCITALOPRAM OXALATE 10 MG/1
TABLET ORAL
COMMUNITY
Start: 2023-08-11

## 2023-08-17 RX ORDER — UREA 10 %
220 LOTION (ML) TOPICAL DAILY
COMMUNITY

## 2023-08-17 RX ORDER — COLLAGENASE SANTYL 250 [ARB'U]/G
OINTMENT TOPICAL
COMMUNITY
Start: 2023-06-09

## 2023-08-17 RX ORDER — ASCORBIC ACID 125 MG
1 TABLET,CHEWABLE ORAL NIGHTLY
COMMUNITY

## 2023-08-17 RX ORDER — AMINO ACIDS/PROTEIN HYDROLYS 15G-100/30
LIQUID IN PACKET (ML) ORAL
COMMUNITY

## 2023-08-17 NOTE — PROGRESS NOTES
Subjective:       Patient ID: Mario Alvarado is a 82 y.o. male.    Chief Complaint: Prostate Cancer      HPI: 82-year-old male, established patient, presents for 3 month visit.    Patient has history of prostate cancer.  He is on Lupron injections.    Little over year ago patient's PSA was 36.  At that point patient was started on Lupron.  Patient's PSA has decreased to undetectable.    At this time patient denies any urinary complaints.    He does have some ulcers developing.  He his under the care of wound care.  He is in a nursing home facility.    Denies any pain or burning urination.  Denies any odor urine.  Denies any fever or body aches.  Denies any significant frequency, urgency, or nocturia.    No other urinary complaints at this time.         Past Medical History:   Past Medical History:   Diagnosis Date    Cardiac anomaly     Elevated PSA     Metabolic encephalopathy     Prostate cancer        Past Surgical Historical:   Past Surgical History:   Procedure Laterality Date    INSERTION OF PACEMAKER      PROSTATE SURGERY  2022    bx +        Medications:   Medication List with Changes/Refills   Current Medications    AMINO ACIDS-PROTEIN HYDROLYS (PRO-STAT SUGAR FREE) 15 GRAM- 100 KCAL/30 ML LIPK    Take by mouth.    ARGININE-GLUTAMINE-CALCIUM HMB 7-7-1.5 GRAM PWPK    Take 1 packet by mouth 2 (two) times a day.    ASCORBIC ACID, VITAMIN C, (VITAMIN C) 500 MG TABLET    Take 500 mg by mouth once daily.    BICALUTAMIDE (CASODEX) 50 MG TAB    Take 1 tablet (50 mg total) by mouth once daily.    DOCUSATE (COLACE) 50 MG/5 ML LIQUID    Take 50 mg by mouth once daily.    ESCITALOPRAM OXALATE (LEXAPRO) 10 MG TABLET        MELATONIN 5 MG CHEW    Take 1 tablet by mouth every evening.    MULTIVITAMIN (ONE DAILY MULTIVITAMIN) PER TABLET    Take 1 tablet by mouth once daily.    SANTYL OINTMENT        ZINC SULFATE 50 MG ZINC (220 MG) TAB TABLET    Take 220 mg by mouth once daily.        Past Social History:   Social History      Socioeconomic History    Marital status:    Tobacco Use    Smoking status: Never    Smokeless tobacco: Never   Substance and Sexual Activity    Alcohol use: Never    Drug use: Never       Allergies:   Review of patient's allergies indicates:   Allergen Reactions    Penicillins         Family History:   Family History   Problem Relation Age of Onset    No Known Problems Father     No Known Problems Mother         Review of Systems:  Review of Systems   Constitutional:  Negative for activity change and appetite change.   HENT:  Negative for congestion and dental problem.    Respiratory:  Negative for chest tightness and shortness of breath.    Cardiovascular:  Negative for chest pain.   Gastrointestinal:  Negative for abdominal distention and abdominal pain.   Genitourinary:  Negative for decreased urine volume, difficulty urinating, dysuria, enuresis, flank pain, frequency, genital sores, hematuria, penile discharge, penile pain, penile swelling, scrotal swelling, testicular pain and urgency.   Musculoskeletal:  Negative for back pain and neck pain.   Neurological:  Negative for dizziness.   Hematological:  Negative for adenopathy.   Psychiatric/Behavioral:  Negative for agitation, behavioral problems and confusion.        Physical Exam:  Physical Exam  Vitals and nursing note reviewed.   Constitutional:       Appearance: He is well-developed.   HENT:      Head: Normocephalic.   Cardiovascular:      Rate and Rhythm: Normal rate and regular rhythm.      Heart sounds: Normal heart sounds.   Pulmonary:      Effort: Pulmonary effort is normal.      Breath sounds: Normal breath sounds.   Abdominal:      General: Bowel sounds are normal.      Palpations: Abdomen is soft.   Skin:     General: Skin is warm and dry.   Neurological:      Mental Status: He is alert and oriented to person, place, and time.         Assessment/Plan:   Prostate cancer:  Patient is due for Lupron injection.  Patient will get a 6 month  Lupron.    Will check the patient's PSA.  We will notify him of the results.      Patient follow-up in 6 months, sooner if needed.  Problem List Items Addressed This Visit          Oncology    Prostate cancer - Primary    Overview     PSA of 36.2.  He underwent prostate ultrasound biopsy July 26, 2022 findings were [place] 6 3+3 on the right mid and right base of the gland both group 1.  Right base was 4% in the right mid was 9%.  Patient was paced on bicalutamide 50 mg pending a bone scan results.  Bone scan was completed August 12, 2022.  Findings were focal areas of uptake in the cervical, thoracic and lumbar spine.  Radiologist was unable to determine if this was traumatic injury, degenerative change or actual lytic or blastic lesion.  He is requesting a CT scan for further evaluation.  CT scan completed showed scoliosis as well as degenerative changes.  Patient denies any new onset bone pain or unexplained weight loss.  Most recent PSA 3 months ago was 0.6.  Patient is on Lupron injections his next injection is due February of 2023.  He was on short course of bicalutamide prevent PSA flare.  He is no longer on this.  no other urologic concerns.              Relevant Medications    leuprolide acetate (6 month) injection 45 mg (Completed)    Other Relevant Orders    Prostate Specific Antigen, Diagnostic

## 2023-08-17 NOTE — PROGRESS NOTES
Using aseptic technique, Lupron 45 mg given to left arm per v/o [facility]. Pt wheelchair bound, but he tolerated injection well. Advised caregiver to have him sit in lobby for 15-30 minutes to monitor for adverse reaction.

## 2023-08-18 LAB — PSA, DIAGNOSTIC: 0.03 NG/ML (ref 0.1–4)

## 2023-08-21 ENCOUNTER — TELEPHONE (OUTPATIENT)
Dept: UROLOGY | Facility: CLINIC | Age: 83
End: 2023-08-21
Payer: MEDICARE

## 2023-08-21 NOTE — TELEPHONE ENCOUNTER
Attempted to contact, no answer no vm setup. BJP    ----- Message from Juan Manuel Dunaway NP sent at 8/21/2023  9:03 AM CDT -----  PSA is good.